# Patient Record
Sex: MALE
[De-identification: names, ages, dates, MRNs, and addresses within clinical notes are randomized per-mention and may not be internally consistent; named-entity substitution may affect disease eponyms.]

---

## 2021-10-11 ENCOUNTER — HOSPITAL ENCOUNTER (OUTPATIENT)
Dept: HOSPITAL 53 - M PLAIMG | Age: 26
End: 2021-10-11
Attending: INTERNAL MEDICINE

## 2021-10-11 DIAGNOSIS — R06.02: Primary | ICD-10-CM

## 2021-10-11 NOTE — REP
INDICATION:

PAIN/SOB



COMPARISON:

None.



TECHNIQUE:

AP, lateral, bilateral oblique views right and left foot.



FINDINGS:

The osseous structures, surrounding soft tissues and joint spaces are symmetric and

normal.  There is no evidence for acute or healed injury.  Surrounding soft tissues

are unremarkable.  No subcutaneous emphysema or radiodense foreign body.  No overt

arthritic changes are appreciated.



IMPRESSION:

Symmetric normal bilateral foot radiograph series.





<Electronically signed by Arsh Traylor > 10/11/21 [id number]

## 2021-10-11 NOTE — REP
INDICATION:

PAIN/SOB



COMPARISON:

None.



TECHNIQUE:

PA and lateral.



FINDINGS:

The mediastinum and cardiac silhouette are normal.  The lung fields are clear and

without acute consolidation, effusion, or pneumothorax.  The skeletal structures are

intact and normal.



IMPRESSION:

No acute cardiopulmonary process.





<Electronically signed by Arsh Traylor > 10/11/21 1101

## 2021-10-11 NOTE — REP
INDICATION:

PAIN/SOB



COMPARISON:

None.



TECHNIQUE:

AP, lateral, coned-down views of the lumbar spine.



FINDINGS:

Three views of the lumbosacral spine demonstrate satisfactory alignment and lordosis

without acute fracture / compression injury or subluxation.  No overt arthritic

degenerative changes are appreciated.



IMPRESSION:

1. Normal lumbosacral spine radiograph series







<Electronically signed by Arsh Traylor > 10/11/21 [id number]